# Patient Record
Sex: MALE | Race: WHITE | NOT HISPANIC OR LATINO
[De-identification: names, ages, dates, MRNs, and addresses within clinical notes are randomized per-mention and may not be internally consistent; named-entity substitution may affect disease eponyms.]

---

## 2022-10-10 ENCOUNTER — NON-APPOINTMENT (OUTPATIENT)
Age: 47
End: 2022-10-10

## 2022-10-13 ENCOUNTER — NON-APPOINTMENT (OUTPATIENT)
Age: 47
End: 2022-10-13

## 2022-10-13 ENCOUNTER — APPOINTMENT (OUTPATIENT)
Dept: GASTROENTEROLOGY | Facility: CLINIC | Age: 47
End: 2022-10-13

## 2022-10-13 VITALS — BODY MASS INDEX: 47.09 KG/M2 | HEIGHT: 67 IN | WEIGHT: 300 LBS

## 2022-10-13 DIAGNOSIS — Z12.11 ENCOUNTER FOR SCREENING FOR MALIGNANT NEOPLASM OF COLON: ICD-10-CM

## 2022-10-13 PROBLEM — Z00.00 ENCOUNTER FOR PREVENTIVE HEALTH EXAMINATION: Status: ACTIVE | Noted: 2022-10-13

## 2022-10-13 PROCEDURE — 99203 OFFICE O/P NEW LOW 30 MIN: CPT

## 2022-10-13 NOTE — PHYSICAL EXAM
[Normal] : alert, normal voice/communication, healthy appearing, no acute distress [Abdomen Tenderness] : non-tender [Abdomen Soft] : soft [Oriented To Time, Place, And Person] : oriented to person, place, and time

## 2022-10-13 NOTE — HISTORY OF PRESENT ILLNESS
[FreeTextEntry1] : Mr. VALENTÍN HAMMOND is a 47 year old male with a PMH of HTN, sleep apnea (uses CPAP), T2DM, HLD.\par \par Patient presents for pre-colonoscopy screening.\par Self-referred to Dr. Siegel. \par No prior colonoscopy. \par Denies change in bowel habits. \par Denies constipation, N/V/D, rectal bleeding, abdominal pain, bloating, unintentional weight loss, early satiety, heartburn or reflux.  \par Denies bloody or black stools. \par BM every 1-2 days; soft, brown stools.  \par Denies family history of colon cancer or advanced colorectal polyps. \par Smoking status: Never. \par Alcohol intake: None. \par Illicit drug use: None. \par \par \par

## 2022-10-13 NOTE — ASSESSMENT
[FreeTextEntry1] : Schedule with Dr. Siegel.\par - Explained the risks (including but not limited to cardiopulmonary anesthetic complications, bleeding, perforation, aspiration, missed lesions and misidentified sites of lesions - complications that might necessitate hospitalization or surgery), benefits and alternatives of colonoscopy were reviewed with the patient. Preparation for the procedure was reviewed with the patient. The patient was informed that she/he would be given intravenous anesthesia by an anesthesiologist for the procedure. The patient was informed that a family member or friend must drive the patient following recovery from the procedure. Patient understands and agrees, all questions answered.\par - Hold Metformin and chlorthalidone morning of procedure.\par \par Patient refused to have VS taken during visit.

## 2022-11-26 ENCOUNTER — RESULT REVIEW (OUTPATIENT)
Age: 47
End: 2022-11-26

## 2022-11-29 ENCOUNTER — APPOINTMENT (OUTPATIENT)
Dept: GASTROENTEROLOGY | Facility: HOSPITAL | Age: 47
End: 2022-11-29